# Patient Record
Sex: FEMALE | Race: WHITE | NOT HISPANIC OR LATINO
[De-identification: names, ages, dates, MRNs, and addresses within clinical notes are randomized per-mention and may not be internally consistent; named-entity substitution may affect disease eponyms.]

---

## 2020-03-10 ENCOUNTER — ASOB RESULT (OUTPATIENT)
Age: 40
End: 2020-03-10

## 2020-03-10 ENCOUNTER — APPOINTMENT (OUTPATIENT)
Dept: ANTEPARTUM | Facility: CLINIC | Age: 40
End: 2020-03-10
Payer: COMMERCIAL

## 2020-03-10 PROCEDURE — 76811 OB US DETAILED SNGL FETUS: CPT

## 2020-03-10 PROCEDURE — 76817 TRANSVAGINAL US OBSTETRIC: CPT | Mod: 59

## 2020-03-11 PROBLEM — Z00.00 ENCOUNTER FOR PREVENTIVE HEALTH EXAMINATION: Status: ACTIVE | Noted: 2020-03-11

## 2020-03-18 ENCOUNTER — APPOINTMENT (OUTPATIENT)
Dept: ANTEPARTUM | Facility: CLINIC | Age: 40
End: 2020-03-18
Payer: COMMERCIAL

## 2020-03-18 ENCOUNTER — ASOB RESULT (OUTPATIENT)
Age: 40
End: 2020-03-18

## 2020-03-18 PROCEDURE — 76816 OB US FOLLOW-UP PER FETUS: CPT

## 2020-03-18 PROCEDURE — 76817 TRANSVAGINAL US OBSTETRIC: CPT

## 2020-04-24 ENCOUNTER — RESULT REVIEW (OUTPATIENT)
Age: 40
End: 2020-04-24

## 2020-04-24 ENCOUNTER — OUTPATIENT (OUTPATIENT)
Dept: OUTPATIENT SERVICES | Facility: HOSPITAL | Age: 40
LOS: 1 days | End: 2020-04-24
Payer: COMMERCIAL

## 2020-04-24 ENCOUNTER — APPOINTMENT (OUTPATIENT)
Dept: ULTRASOUND IMAGING | Facility: CLINIC | Age: 40
End: 2020-04-24
Payer: COMMERCIAL

## 2020-04-24 DIAGNOSIS — Z00.8 ENCOUNTER FOR OTHER GENERAL EXAMINATION: ICD-10-CM

## 2020-04-24 PROCEDURE — 93971 EXTREMITY STUDY: CPT

## 2020-04-24 PROCEDURE — 93971 EXTREMITY STUDY: CPT | Mod: 26,LT

## 2020-07-30 ENCOUNTER — TRANSCRIPTION ENCOUNTER (OUTPATIENT)
Age: 40
End: 2020-07-30

## 2020-07-31 ENCOUNTER — INPATIENT (INPATIENT)
Facility: HOSPITAL | Age: 40
LOS: 0 days | Discharge: HOME | End: 2020-08-01
Attending: OBSTETRICS & GYNECOLOGY | Admitting: OBSTETRICS & GYNECOLOGY
Payer: COMMERCIAL

## 2020-07-31 VITALS — WEIGHT: 175.05 LBS | HEIGHT: 65 IN

## 2020-07-31 DIAGNOSIS — Z33.1 PREGNANT STATE, INCIDENTAL: ICD-10-CM

## 2020-07-31 LAB
BASOPHILS # BLD AUTO: 0.03 K/UL — SIGNIFICANT CHANGE UP (ref 0–0.2)
BASOPHILS NFR BLD AUTO: 0.3 % — SIGNIFICANT CHANGE UP (ref 0–2)
BLD GP AB SCN SERPL QL: NEGATIVE — SIGNIFICANT CHANGE UP
EOSINOPHIL # BLD AUTO: 0.14 K/UL — SIGNIFICANT CHANGE UP (ref 0–0.5)
EOSINOPHIL NFR BLD AUTO: 1.5 % — SIGNIFICANT CHANGE UP (ref 0–6)
HCT VFR BLD CALC: 34.9 % — SIGNIFICANT CHANGE UP (ref 34.5–45)
HGB BLD-MCNC: 11.5 G/DL — SIGNIFICANT CHANGE UP (ref 11.5–15.5)
IMM GRANULOCYTES NFR BLD AUTO: 0.4 % — SIGNIFICANT CHANGE UP (ref 0–1.5)
LYMPHOCYTES # BLD AUTO: 1.49 K/UL — SIGNIFICANT CHANGE UP (ref 1–3.3)
LYMPHOCYTES # BLD AUTO: 15.6 % — SIGNIFICANT CHANGE UP (ref 13–44)
MCHC RBC-ENTMCNC: 29.8 PG — SIGNIFICANT CHANGE UP (ref 27–34)
MCHC RBC-ENTMCNC: 33 GM/DL — SIGNIFICANT CHANGE UP (ref 32–36)
MCV RBC AUTO: 90.4 FL — SIGNIFICANT CHANGE UP (ref 80–100)
MONOCYTES # BLD AUTO: 0.77 K/UL — SIGNIFICANT CHANGE UP (ref 0–0.9)
MONOCYTES NFR BLD AUTO: 8.1 % — SIGNIFICANT CHANGE UP (ref 2–14)
NEUTROPHILS # BLD AUTO: 7.09 K/UL — SIGNIFICANT CHANGE UP (ref 1.8–7.4)
NEUTROPHILS NFR BLD AUTO: 74.1 % — SIGNIFICANT CHANGE UP (ref 43–77)
NRBC # BLD: 0 /100 WBCS — SIGNIFICANT CHANGE UP (ref 0–0)
PLATELET # BLD AUTO: 238 K/UL — SIGNIFICANT CHANGE UP (ref 150–400)
RBC # BLD: 3.86 M/UL — SIGNIFICANT CHANGE UP (ref 3.8–5.2)
RBC # FLD: 15.7 % — HIGH (ref 10.3–14.5)
RH IG SCN BLD-IMP: POSITIVE — SIGNIFICANT CHANGE UP
RH IG SCN BLD-IMP: POSITIVE — SIGNIFICANT CHANGE UP
SARS-COV-2 IGG SERPL QL IA: NEGATIVE — SIGNIFICANT CHANGE UP
SARS-COV-2 IGM SERPL IA-ACNC: <0.1 INDEX — SIGNIFICANT CHANGE UP
SARS-COV-2 RNA SPEC QL NAA+PROBE: SIGNIFICANT CHANGE UP
T PALLIDUM AB TITR SER: NEGATIVE — SIGNIFICANT CHANGE UP
WBC # BLD: 9.56 K/UL — SIGNIFICANT CHANGE UP (ref 3.8–10.5)
WBC # FLD AUTO: 9.56 K/UL — SIGNIFICANT CHANGE UP (ref 3.8–10.5)

## 2020-07-31 PROCEDURE — 88307 TISSUE EXAM BY PATHOLOGIST: CPT | Mod: 26

## 2020-07-31 RX ORDER — ASCORBIC ACID 60 MG
500 TABLET,CHEWABLE ORAL DAILY
Refills: 0 | Status: DISCONTINUED | OUTPATIENT
Start: 2020-07-31 | End: 2020-08-01

## 2020-07-31 RX ORDER — LANOLIN
1 OINTMENT (GRAM) TOPICAL EVERY 6 HOURS
Refills: 0 | Status: DISCONTINUED | OUTPATIENT
Start: 2020-07-31 | End: 2020-08-01

## 2020-07-31 RX ORDER — AER TRAVELER 0.5 G/1
1 SOLUTION RECTAL; TOPICAL EVERY 4 HOURS
Refills: 0 | Status: DISCONTINUED | OUTPATIENT
Start: 2020-07-31 | End: 2020-08-01

## 2020-07-31 RX ORDER — MAGNESIUM HYDROXIDE 400 MG/1
30 TABLET, CHEWABLE ORAL
Refills: 0 | Status: DISCONTINUED | OUTPATIENT
Start: 2020-07-31 | End: 2020-08-01

## 2020-07-31 RX ORDER — BENZOCAINE 10 %
1 GEL (GRAM) MUCOUS MEMBRANE EVERY 6 HOURS
Refills: 0 | Status: DISCONTINUED | OUTPATIENT
Start: 2020-07-31 | End: 2020-08-01

## 2020-07-31 RX ORDER — HYDROCORTISONE 1 %
1 OINTMENT (GRAM) TOPICAL EVERY 6 HOURS
Refills: 0 | Status: DISCONTINUED | OUTPATIENT
Start: 2020-07-31 | End: 2020-08-01

## 2020-07-31 RX ORDER — IBUPROFEN 200 MG
600 TABLET ORAL EVERY 6 HOURS
Refills: 0 | Status: DISCONTINUED | OUTPATIENT
Start: 2020-07-31 | End: 2020-08-01

## 2020-07-31 RX ORDER — PRAMOXINE HYDROCHLORIDE 150 MG/15G
1 AEROSOL, FOAM RECTAL EVERY 4 HOURS
Refills: 0 | Status: DISCONTINUED | OUTPATIENT
Start: 2020-07-31 | End: 2020-08-01

## 2020-07-31 RX ORDER — OXYCODONE HYDROCHLORIDE 5 MG/1
5 TABLET ORAL ONCE
Refills: 0 | Status: DISCONTINUED | OUTPATIENT
Start: 2020-07-31 | End: 2020-08-01

## 2020-07-31 RX ORDER — OXYTOCIN 10 UNIT/ML
4 VIAL (ML) INJECTION
Qty: 30 | Refills: 0 | Status: DISCONTINUED | OUTPATIENT
Start: 2020-07-31 | End: 2020-07-31

## 2020-07-31 RX ORDER — DIBUCAINE 1 %
1 OINTMENT (GRAM) RECTAL EVERY 6 HOURS
Refills: 0 | Status: DISCONTINUED | OUTPATIENT
Start: 2020-07-31 | End: 2020-08-01

## 2020-07-31 RX ORDER — SODIUM CHLORIDE 9 MG/ML
1000 INJECTION, SOLUTION INTRAVENOUS
Refills: 0 | Status: DISCONTINUED | OUTPATIENT
Start: 2020-07-31 | End: 2020-07-31

## 2020-07-31 RX ORDER — SODIUM CHLORIDE 9 MG/ML
3 INJECTION INTRAMUSCULAR; INTRAVENOUS; SUBCUTANEOUS EVERY 8 HOURS
Refills: 0 | Status: DISCONTINUED | OUTPATIENT
Start: 2020-07-31 | End: 2020-08-01

## 2020-07-31 RX ORDER — FERROUS SULFATE 325(65) MG
325 TABLET ORAL DAILY
Refills: 0 | Status: DISCONTINUED | OUTPATIENT
Start: 2020-07-31 | End: 2020-08-01

## 2020-07-31 RX ORDER — OXYTOCIN 10 UNIT/ML
333.33 VIAL (ML) INJECTION
Qty: 20 | Refills: 0 | Status: DISCONTINUED | OUTPATIENT
Start: 2020-07-31 | End: 2020-08-01

## 2020-07-31 RX ORDER — CITRIC ACID/SODIUM CITRATE 300-500 MG
15 SOLUTION, ORAL ORAL EVERY 6 HOURS
Refills: 0 | Status: DISCONTINUED | OUTPATIENT
Start: 2020-07-31 | End: 2020-07-31

## 2020-07-31 RX ORDER — OXYCODONE HYDROCHLORIDE 5 MG/1
5 TABLET ORAL
Refills: 0 | Status: DISCONTINUED | OUTPATIENT
Start: 2020-07-31 | End: 2020-08-01

## 2020-07-31 RX ORDER — ACETAMINOPHEN 500 MG
975 TABLET ORAL
Refills: 0 | Status: DISCONTINUED | OUTPATIENT
Start: 2020-07-31 | End: 2020-08-01

## 2020-07-31 RX ORDER — KETOROLAC TROMETHAMINE 30 MG/ML
30 SYRINGE (ML) INJECTION ONCE
Refills: 0 | Status: DISCONTINUED | OUTPATIENT
Start: 2020-07-31 | End: 2020-07-31

## 2020-07-31 RX ORDER — TETANUS TOXOID, REDUCED DIPHTHERIA TOXOID AND ACELLULAR PERTUSSIS VACCINE, ADSORBED 5; 2.5; 8; 8; 2.5 [IU]/.5ML; [IU]/.5ML; UG/.5ML; UG/.5ML; UG/.5ML
0.5 SUSPENSION INTRAMUSCULAR ONCE
Refills: 0 | Status: DISCONTINUED | OUTPATIENT
Start: 2020-07-31 | End: 2020-08-01

## 2020-07-31 RX ORDER — IBUPROFEN 200 MG
600 TABLET ORAL EVERY 6 HOURS
Refills: 0 | Status: COMPLETED | OUTPATIENT
Start: 2020-07-31 | End: 2021-06-29

## 2020-07-31 RX ORDER — DIPHENHYDRAMINE HCL 50 MG
25 CAPSULE ORAL EVERY 6 HOURS
Refills: 0 | Status: DISCONTINUED | OUTPATIENT
Start: 2020-07-31 | End: 2020-08-01

## 2020-07-31 RX ORDER — SIMETHICONE 80 MG/1
80 TABLET, CHEWABLE ORAL EVERY 4 HOURS
Refills: 0 | Status: DISCONTINUED | OUTPATIENT
Start: 2020-07-31 | End: 2020-08-01

## 2020-07-31 RX ADMIN — Medication 30 MILLIGRAM(S): at 17:19

## 2020-07-31 RX ADMIN — Medication 4 MILLIUNIT(S)/MIN: at 06:17

## 2020-07-31 RX ADMIN — Medication 975 MILLIGRAM(S): at 21:55

## 2020-07-31 RX ADMIN — Medication 0.2 MILLIGRAM(S): at 15:14

## 2020-07-31 RX ADMIN — Medication 1000 MILLIUNIT(S)/MIN: at 16:09

## 2020-07-31 RX ADMIN — Medication 975 MILLIGRAM(S): at 21:18

## 2020-07-31 NOTE — CHART NOTE - NSCHARTNOTEFT_GEN_A_CORE
Patient assessed at bedside after nursing reported passage of clot and bleeding with fundal massage.  Upon assessment patient is comfortable and breastfeeding baby.  She denies lightheadedness/dizziness/headache/fever/chills/nausea/vomiting.  Pad has approximately 50cc of blood and clot.    VS  T(C): 37.1 (07-31-20 @ 15:45)  HR: 92 (07-31-20 @ 16:00)  BP: 105/55 (07-31-20 @ 16:00)  RR: 18 (07-31-20 @ 16:00)  SpO2: 95% (07-31-20 @ 16:00)    PE:  Gen - NAD  Abdomen - soft, non tender, fundus firm   - scant blood on fundal massage, no clot in vaginal vault, bilateral swollen labia    A/P: PPD#0 s/p vaginal delivery with  due to partial placental abruption during delivery, hemodynamically stable in the postpartum period  -continue with routine post-op care  -AM CBC  -continue to monitor for bleeding or changes in vital signs    d/w Dr. Tyler Johnson, PGY2 Patient assessed at bedside after nursing reported passage of clot and bleeding with fundal massage.  Upon assessment patient is comfortable and breastfeeding baby.  She denies lightheadedness/dizziness/headache/fever/chills/nausea/vomiting.  Pad has approximately 50cc of blood and clot.    VS  T(C): 37.1 (07-31-20 @ 15:45)  HR: 92 (07-31-20 @ 16:00)  BP: 105/55 (07-31-20 @ 16:00)  RR: 18 (07-31-20 @ 16:00)  SpO2: 95% (07-31-20 @ 16:00)    PE:  Gen - NAD  Abdomen - soft, non tender, fundus firm   - scant blood on fundal massage, no clot in vaginal vault, bilateral swollen labia    A/P: PPD#0 s/p VAVD with  s/p Methergine IM x1 due to partial placental abruption during delivery, hemodynamically stable in the postpartum period  -continue with routine post-op care  -AM CBC  -continue to monitor for bleeding or changes in vital signs    d/w Dr. Tyler Johnson, PGY2

## 2020-08-01 ENCOUNTER — TRANSCRIPTION ENCOUNTER (OUTPATIENT)
Age: 40
End: 2020-08-01

## 2020-08-01 VITALS
TEMPERATURE: 98 F | DIASTOLIC BLOOD PRESSURE: 76 MMHG | RESPIRATION RATE: 18 BRPM | SYSTOLIC BLOOD PRESSURE: 116 MMHG | HEART RATE: 76 BPM

## 2020-08-01 DIAGNOSIS — D50.0 IRON DEFICIENCY ANEMIA SECONDARY TO BLOOD LOSS (CHRONIC): ICD-10-CM

## 2020-08-01 LAB
HCT VFR BLD CALC: 28.7 % — LOW (ref 34.5–45)
HGB BLD-MCNC: 9.3 G/DL — LOW (ref 11.5–15.5)
MCHC RBC-ENTMCNC: 29.6 PG — SIGNIFICANT CHANGE UP (ref 27–34)
MCHC RBC-ENTMCNC: 32.4 GM/DL — SIGNIFICANT CHANGE UP (ref 32–36)
MCV RBC AUTO: 91.4 FL — SIGNIFICANT CHANGE UP (ref 80–100)
NRBC # BLD: 0 /100 WBCS — SIGNIFICANT CHANGE UP (ref 0–0)
PLATELET # BLD AUTO: 187 K/UL — SIGNIFICANT CHANGE UP (ref 150–400)
RBC # BLD: 3.14 M/UL — LOW (ref 3.8–5.2)
RBC # FLD: 15.9 % — HIGH (ref 10.3–14.5)
WBC # BLD: 12.44 K/UL — HIGH (ref 3.8–10.5)
WBC # FLD AUTO: 12.44 K/UL — HIGH (ref 3.8–10.5)

## 2020-08-01 PROCEDURE — 86780 TREPONEMA PALLIDUM: CPT

## 2020-08-01 PROCEDURE — 59050 FETAL MONITOR W/REPORT: CPT

## 2020-08-01 PROCEDURE — 59025 FETAL NON-STRESS TEST: CPT

## 2020-08-01 PROCEDURE — 86900 BLOOD TYPING SEROLOGIC ABO: CPT

## 2020-08-01 PROCEDURE — 86850 RBC ANTIBODY SCREEN: CPT

## 2020-08-01 PROCEDURE — 88307 TISSUE EXAM BY PATHOLOGIST: CPT

## 2020-08-01 PROCEDURE — 87635 SARS-COV-2 COVID-19 AMP PRB: CPT

## 2020-08-01 PROCEDURE — 85027 COMPLETE CBC AUTOMATED: CPT

## 2020-08-01 PROCEDURE — 86769 SARS-COV-2 COVID-19 ANTIBODY: CPT

## 2020-08-01 PROCEDURE — 86901 BLOOD TYPING SEROLOGIC RH(D): CPT

## 2020-08-01 RX ADMIN — Medication 600 MILLIGRAM(S): at 13:30

## 2020-08-01 RX ADMIN — Medication 600 MILLIGRAM(S): at 06:55

## 2020-08-01 RX ADMIN — Medication 975 MILLIGRAM(S): at 03:02

## 2020-08-01 RX ADMIN — Medication 975 MILLIGRAM(S): at 03:35

## 2020-08-01 RX ADMIN — Medication 975 MILLIGRAM(S): at 10:00

## 2020-08-01 RX ADMIN — Medication 500 MILLIGRAM(S): at 12:44

## 2020-08-01 RX ADMIN — Medication 600 MILLIGRAM(S): at 00:40

## 2020-08-01 RX ADMIN — Medication 600 MILLIGRAM(S): at 12:43

## 2020-08-01 RX ADMIN — Medication 1 TABLET(S): at 12:43

## 2020-08-01 RX ADMIN — Medication 600 MILLIGRAM(S): at 00:07

## 2020-08-01 RX ADMIN — Medication 600 MILLIGRAM(S): at 06:20

## 2020-08-01 RX ADMIN — Medication 325 MILLIGRAM(S): at 12:44

## 2020-08-01 RX ADMIN — Medication 975 MILLIGRAM(S): at 09:15

## 2020-08-01 NOTE — PROGRESS NOTE ADULT - ASSESSMENT
A/P: 39y  PPD # 1   S/P VAVD due to placental abruption and cat II tracing. EBL 700cc, now w/ anemia due to acute blood loss during delivery.

## 2020-08-01 NOTE — DISCHARGE NOTE OB - PATIENT PORTAL LINK FT
You can access the FollowMyHealth Patient Portal offered by [facility] by registering at the following website: [email or web address]. By joining [employer]’s FollowMyHealth portal, you will also be able to view your health information using other applications (apps) compatible with our system.

## 2020-08-01 NOTE — DISCHARGE NOTE OB - CARE PROVIDER_API CALL
Joshua Matthews  OBSTETRICS AND GYNECOLOGY  [address] [place] [address] [address]  [place] [address]  Phone: [phone]  Fax: [phone]  Follow Up Time:

## 2020-08-01 NOTE — PROGRESS NOTE ADULT - SUBJECTIVE AND OBJECTIVE BOX
Postpartum Note- PPD#1    Prenatal Labs  Blood type: O Positive  Rubella IgG:   RPR: Negative      S:Patient w/o complaints, pain is controlled.    Pt is OOB, tolerating PO, voiding. Vaginal bleeding moderate to heavy but no passage of clots. She is changing a saturated pad every 3-4 hours. Patient is breastfeeding. Denies dizziness, CP, SOB, n/v, abdominal pain or calf pain.    pmhx/surg: h/o     O:  Vital Signs Last 24 Hrs  T(C): 36.7 (01 Aug 2020 05:04), Max: 37.5 (2020 17:45)  T(F): 98 (01 Aug 2020 05:04), Max: 99.5 (2020 17:45)  HR: 68 (01 Aug 2020 05:04) (68 - 94)  BP: 102/54 (01 Aug 2020 05:04) (97/54 - 118/65)  BP(mean): 86 (2020 17:45) (71 - 86)  RR: 18 (01 Aug 2020 05:04) (18 - 18)  SpO2: 98% (01 Aug 2020 05:04) (95% - 99%)     Gen: NAD  Abdomen: Soft, nontender, non-distended, fundus firm.  Vaginal: Lochia WNL. Pad mostly clean (placed 2 hours ago)  Ext: Neg edema, Neg calf tenderness    LABS:    Hemoglobin: 9.3 g/dL ( @ 05:11)  Hemoglobin: 11.5 g/dL ( @ 05:57)      Hematocrit: 28.7 % ( @ 05:11)  Hematocrit: 34.9 % ( @ 05:57) Postpartum Note- PPD#1    Prenatal Labs  Blood type: O Positive  Rubella IgG: immune  RPR: Negative      S:Patient w/o complaints, pain is controlled.    Pt is OOB, tolerating PO, voiding. Vaginal bleeding moderate to heavy but no passage of clots. She is changing a saturated pad every 3-4 hours. Patient is breastfeeding. Denies dizziness, CP, SOB, n/v, abdominal pain or calf pain.    pmhx/surg: h/o     O:  Vital Signs Last 24 Hrs  T(C): 36.7 (01 Aug 2020 05:04), Max: 37.5 (2020 17:45)  T(F): 98 (01 Aug 2020 05:04), Max: 99.5 (2020 17:45)  HR: 68 (01 Aug 2020 05:04) (68 - 94)  BP: 102/54 (01 Aug 2020 05:04) (97/54 - 118/65)  BP(mean): 86 (2020 17:45) (71 - 86)  RR: 18 (01 Aug 2020 05:04) (18 - 18)  SpO2: 98% (01 Aug 2020 05:04) (95% - 99%)     Gen: NAD  Abdomen: Soft, nontender, non-distended, fundus firm.  Vaginal: Lochia WNL. Pad mostly clean (placed 2 hours ago)  Ext: Neg edema, Neg calf tenderness    LABS:    Hemoglobin: 9.3 g/dL ( @ 05:11)  Hemoglobin: 11.5 g/dL ( @ 05:57)      Hematocrit: 28.7 % ( @ 05:11)  Hematocrit: 34.9 % ( @ 05:57)

## 2021-03-10 ENCOUNTER — TRANSCRIPTION ENCOUNTER (OUTPATIENT)
Age: 41
End: 2021-03-10

## 2022-09-19 ENCOUNTER — APPOINTMENT (OUTPATIENT)
Dept: RADIOLOGY | Facility: HOSPITAL | Age: 42
End: 2022-09-19

## 2022-09-19 ENCOUNTER — OUTPATIENT (OUTPATIENT)
Dept: OUTPATIENT SERVICES | Facility: HOSPITAL | Age: 42
LOS: 1 days | End: 2022-09-19
Payer: COMMERCIAL

## 2022-09-19 DIAGNOSIS — Z00.8 ENCOUNTER FOR OTHER GENERAL EXAMINATION: ICD-10-CM

## 2022-09-19 PROCEDURE — 72070 X-RAY EXAM THORAC SPINE 2VWS: CPT

## 2022-09-19 PROCEDURE — 72100 X-RAY EXAM L-S SPINE 2/3 VWS: CPT

## 2022-09-19 PROCEDURE — 72040 X-RAY EXAM NECK SPINE 2-3 VW: CPT | Mod: 26

## 2022-09-19 PROCEDURE — 72070 X-RAY EXAM THORAC SPINE 2VWS: CPT | Mod: 26

## 2022-09-19 PROCEDURE — 72100 X-RAY EXAM L-S SPINE 2/3 VWS: CPT | Mod: 26

## 2022-09-19 PROCEDURE — 72040 X-RAY EXAM NECK SPINE 2-3 VW: CPT

## 2022-09-21 ENCOUNTER — APPOINTMENT (OUTPATIENT)
Dept: ORTHOPEDIC SURGERY | Facility: CLINIC | Age: 42
End: 2022-09-21

## 2022-09-21 ENCOUNTER — NON-APPOINTMENT (OUTPATIENT)
Age: 42
End: 2022-09-21

## 2022-09-21 VITALS
HEIGHT: 65 IN | DIASTOLIC BLOOD PRESSURE: 86 MMHG | SYSTOLIC BLOOD PRESSURE: 135 MMHG | WEIGHT: 116 LBS | BODY MASS INDEX: 19.33 KG/M2 | HEART RATE: 109 BPM

## 2022-09-21 DIAGNOSIS — M54.6 PAIN IN THORACIC SPINE: ICD-10-CM

## 2022-09-21 PROCEDURE — 99204 OFFICE O/P NEW MOD 45 MIN: CPT | Mod: 25

## 2022-09-21 PROCEDURE — 96372 THER/PROPH/DIAG INJ SC/IM: CPT

## 2022-09-21 RX ADMIN — KETOROLAC TROMETHAMINE 0 MG/ML: 30 INJECTION, SOLUTION INTRAMUSCULAR; INTRAVENOUS at 00:00

## 2022-09-21 NOTE — PHYSICAL EXAM
[Stooped] : stooped [LE] : Sensory: Intact in bilateral lower extremities [Knee] : patellar 2+ and symmetric bilaterally [Ankle] : ankle 2+ and symmetric bilaterally [DP] : dorsalis pedis 2+ and symmetric bilaterally [SLR] : negative straight leg raise [de-identified] : The pt is awake, alert and oriented to self, place and time, is comfortable and in no acute distress. Inspection of neck, back and lower extremities bilaterally reveals no rashes or ecchymotic lesions.  There is no obvious abnormal spinal curvature in the sagittal and coronal planes. There is no tenderness over the cervical, thoracic spine, or the paraspinal or upper and lower extremities musculature.  Midline lumbar tenderness.  There is no sacroiliac tenderness. No greater trochanteric tenderness bilaterally. No atrophy or abnormal movements noted in the upper or lower extremities. There is no swelling noted in the upper or lower extremities bilaterally. No cervical lymphadenopathy noted anteriorly. No joint laxity noted in the upper and lower extremity joints bilaterally.\par Hip range of motion is degrees internal rotation 30° external rotation without pain. Full range of motion of the shoulders bilaterally with no significant pain\par There is no groin pain with hip internal rotation and a negative LARS test bilaterally.  [de-identified] : flex to mid tibia with reversal of spinal rhythm, 20 degrees extension with back pain [de-identified] : ACC: [id number] EXAM: XR T SPINE 2 VIEWS\par ACC: [id number] EXAM: XR LS SPINE AP LAT 2-3 VIEWS\par \par PROCEDURE DATE: 09/19/2022\par \par INTERPRETATION: CLINICAL INDICATION: back pain\par \par EXAM:\par AP lateral thoracic spine and AP lateral coned-down lateral lumbosacral spine from 9/19/2022 at 1801. No similar prior studies available for comparison.\par \par IMPRESSION:\par No compression fractures, spondylolistheses, or spondylolysis defects.\par \par Disk space heights preserved and facet alignment maintained.\par \par Unremarkable SI joints and pubic symphysis. Preserved bilateral hip joint spaces and no gross radiographic evidence for AVN.\par \par Small cystic lucency with thin sclerotic margination in lateral right femoral head neck junction region compatible with subcortical cyst/synovial herniation pit. No additional focal osseous lesions.\par \par --- End of Report ---\par \par \par DEWAYNE CHRISTENSEN MD; Attending Radiologist\par This document has been electronically signed. Sep 20 2022 10:33AM

## 2022-09-21 NOTE — DISCUSSION/SUMMARY
[Medication Risks Reviewed] : Medication risks reviewed [de-identified] : X-rays of the thoracic and lumbar spine performed previously were independently reviewed by me and findings discussed with the patient.  She has reported significant low back pain after trying to lift her child from a car seat 2 weeks ago.  X-rays do not show any obvious significant pathology in the lumbar spine and I suspect lumbar sprain.  Prescribed her diclofenac and methocarbamol as well as physical therapy.  For pain today offered her an injection of Toradol and she wanted proceed.  Under sterile conditions 30 mg of Toradol was administered intermuscularly by the LPN without incident.\par \par Recommended follow-up in 4 to 6 weeks on as-needed basis.  If her symptoms persist or worsen MRI lumbar spine and/or thoracic spine may be considered.\par \par We discussed the subcortical cyst synovial herniation pit noted on her right femoral head neck junction.  For now there is no clinical significance on exam.  However this can be evaluated by a hip surgeon in the future if she would like to proceed with it.
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
## 2022-09-22 ENCOUNTER — NON-APPOINTMENT (OUTPATIENT)
Age: 42
End: 2022-09-22

## 2022-09-23 ENCOUNTER — EMERGENCY (EMERGENCY)
Facility: HOSPITAL | Age: 42
LOS: 1 days | Discharge: HOME | End: 2022-09-23
Attending: EMERGENCY MEDICINE | Admitting: EMERGENCY MEDICINE
Payer: COMMERCIAL

## 2022-09-23 VITALS
HEIGHT: 65 IN | SYSTOLIC BLOOD PRESSURE: 141 MMHG | DIASTOLIC BLOOD PRESSURE: 83 MMHG | RESPIRATION RATE: 18 BRPM | HEART RATE: 112 BPM | TEMPERATURE: 99 F | OXYGEN SATURATION: 97 %

## 2022-09-23 LAB
ALBUMIN SERPL ELPH-MCNC: 3.7 G/DL — SIGNIFICANT CHANGE UP (ref 3.3–5)
ALP SERPL-CCNC: 63 U/L — SIGNIFICANT CHANGE UP (ref 40–120)
ALT FLD-CCNC: 19 U/L — SIGNIFICANT CHANGE UP (ref 10–45)
ANION GAP SERPL CALC-SCNC: 7 MMOL/L — SIGNIFICANT CHANGE UP (ref 5–17)
APPEARANCE UR: CLEAR — SIGNIFICANT CHANGE UP
AST SERPL-CCNC: 14 U/L — SIGNIFICANT CHANGE UP (ref 10–40)
BACTERIA # UR AUTO: ABNORMAL /HPF
BASOPHILS # BLD AUTO: 0.02 K/UL — SIGNIFICANT CHANGE UP (ref 0–0.2)
BASOPHILS NFR BLD AUTO: 0.3 % — SIGNIFICANT CHANGE UP (ref 0–2)
BILIRUB SERPL-MCNC: 0.6 MG/DL — SIGNIFICANT CHANGE UP (ref 0.2–1.2)
BILIRUB UR-MCNC: NEGATIVE — SIGNIFICANT CHANGE UP
BUN SERPL-MCNC: 6 MG/DL — LOW (ref 7–23)
CALCIUM SERPL-MCNC: 9.1 MG/DL — SIGNIFICANT CHANGE UP (ref 8.4–10.5)
CHLORIDE SERPL-SCNC: 100 MMOL/L — SIGNIFICANT CHANGE UP (ref 96–108)
CO2 SERPL-SCNC: 27 MMOL/L — SIGNIFICANT CHANGE UP (ref 22–31)
COLOR SPEC: YELLOW — SIGNIFICANT CHANGE UP
CREAT SERPL-MCNC: 0.71 MG/DL — SIGNIFICANT CHANGE UP (ref 0.5–1.3)
DIFF PNL FLD: ABNORMAL
EGFR: 109 ML/MIN/1.73M2 — SIGNIFICANT CHANGE UP
EOSINOPHIL # BLD AUTO: 0.01 K/UL — SIGNIFICANT CHANGE UP (ref 0–0.5)
EOSINOPHIL NFR BLD AUTO: 0.1 % — SIGNIFICANT CHANGE UP (ref 0–6)
EPI CELLS # UR: ABNORMAL
GLUCOSE SERPL-MCNC: 106 MG/DL — HIGH (ref 70–99)
GLUCOSE UR QL: NEGATIVE — SIGNIFICANT CHANGE UP
HCT VFR BLD CALC: 33.4 % — LOW (ref 34.5–45)
HGB BLD-MCNC: 11 G/DL — LOW (ref 11.5–15.5)
IMM GRANULOCYTES NFR BLD AUTO: 0.9 % — SIGNIFICANT CHANGE UP (ref 0–0.9)
KETONES UR-MCNC: ABNORMAL
LEUKOCYTE ESTERASE UR-ACNC: ABNORMAL
LYMPHOCYTES # BLD AUTO: 0.97 K/UL — LOW (ref 1–3.3)
LYMPHOCYTES # BLD AUTO: 14.3 % — SIGNIFICANT CHANGE UP (ref 13–44)
MCHC RBC-ENTMCNC: 28.9 PG — SIGNIFICANT CHANGE UP (ref 27–34)
MCHC RBC-ENTMCNC: 32.9 GM/DL — SIGNIFICANT CHANGE UP (ref 32–36)
MCV RBC AUTO: 87.9 FL — SIGNIFICANT CHANGE UP (ref 80–100)
MONOCYTES # BLD AUTO: 0.63 K/UL — SIGNIFICANT CHANGE UP (ref 0–0.9)
MONOCYTES NFR BLD AUTO: 9.3 % — SIGNIFICANT CHANGE UP (ref 2–14)
NEUTROPHILS # BLD AUTO: 5.09 K/UL — SIGNIFICANT CHANGE UP (ref 1.8–7.4)
NEUTROPHILS NFR BLD AUTO: 75.1 % — SIGNIFICANT CHANGE UP (ref 43–77)
NITRITE UR-MCNC: NEGATIVE — SIGNIFICANT CHANGE UP
NRBC # BLD: 0 /100 WBCS — SIGNIFICANT CHANGE UP (ref 0–0)
PH UR: 6.5 — SIGNIFICANT CHANGE UP (ref 5–8)
PLATELET # BLD AUTO: 283 K/UL — SIGNIFICANT CHANGE UP (ref 150–400)
POTASSIUM SERPL-MCNC: 3.5 MMOL/L — SIGNIFICANT CHANGE UP (ref 3.5–5.3)
POTASSIUM SERPL-SCNC: 3.5 MMOL/L — SIGNIFICANT CHANGE UP (ref 3.5–5.3)
PROT SERPL-MCNC: 7.9 G/DL — SIGNIFICANT CHANGE UP (ref 6–8.3)
PROT UR-MCNC: NEGATIVE — SIGNIFICANT CHANGE UP
RAPID RVP RESULT: SIGNIFICANT CHANGE UP
RBC # BLD: 3.8 M/UL — SIGNIFICANT CHANGE UP (ref 3.8–5.2)
RBC # FLD: 13.7 % — SIGNIFICANT CHANGE UP (ref 10.3–14.5)
RBC CASTS # UR COMP ASSIST: SIGNIFICANT CHANGE UP /HPF (ref 0–4)
SARS-COV-2 RNA SPEC QL NAA+PROBE: SIGNIFICANT CHANGE UP
SODIUM SERPL-SCNC: 134 MMOL/L — LOW (ref 135–145)
SP GR SPEC: 1.01 — SIGNIFICANT CHANGE UP (ref 1.01–1.02)
UROBILINOGEN FLD QL: NEGATIVE — SIGNIFICANT CHANGE UP
WBC # BLD: 6.78 K/UL — SIGNIFICANT CHANGE UP (ref 3.8–10.5)
WBC # FLD AUTO: 6.78 K/UL — SIGNIFICANT CHANGE UP (ref 3.8–10.5)
WBC UR QL: ABNORMAL /HPF (ref 0–5)

## 2022-09-23 PROCEDURE — 99284 EMERGENCY DEPT VISIT MOD MDM: CPT | Mod: 25

## 2022-09-23 PROCEDURE — 81001 URINALYSIS AUTO W/SCOPE: CPT

## 2022-09-23 PROCEDURE — 0225U NFCT DS DNA&RNA 21 SARSCOV2: CPT

## 2022-09-23 PROCEDURE — 74177 CT ABD & PELVIS W/CONTRAST: CPT | Mod: MA

## 2022-09-23 PROCEDURE — 80053 COMPREHEN METABOLIC PANEL: CPT

## 2022-09-23 PROCEDURE — 85025 COMPLETE CBC W/AUTO DIFF WBC: CPT

## 2022-09-23 PROCEDURE — 96361 HYDRATE IV INFUSION ADD-ON: CPT

## 2022-09-23 PROCEDURE — 96365 THER/PROPH/DIAG IV INF INIT: CPT | Mod: XU

## 2022-09-23 PROCEDURE — 87086 URINE CULTURE/COLONY COUNT: CPT

## 2022-09-23 PROCEDURE — 74177 CT ABD & PELVIS W/CONTRAST: CPT | Mod: 26,MA

## 2022-09-23 PROCEDURE — 96375 TX/PRO/DX INJ NEW DRUG ADDON: CPT

## 2022-09-23 PROCEDURE — 96368 THER/DIAG CONCURRENT INF: CPT

## 2022-09-23 PROCEDURE — 99285 EMERGENCY DEPT VISIT HI MDM: CPT

## 2022-09-23 PROCEDURE — 36415 COLL VENOUS BLD VENIPUNCTURE: CPT

## 2022-09-23 PROCEDURE — 87591 N.GONORRHOEAE DNA AMP PROB: CPT

## 2022-09-23 PROCEDURE — 87491 CHLMYD TRACH DNA AMP PROBE: CPT

## 2022-09-23 RX ORDER — METHOCARBAMOL 500 MG/1
1 TABLET, FILM COATED ORAL
Qty: 15 | Refills: 0
Start: 2022-09-23 | End: 2022-09-27

## 2022-09-23 RX ORDER — IBUPROFEN 200 MG
1 TABLET ORAL
Qty: 20 | Refills: 0
Start: 2022-09-23 | End: 2022-09-27

## 2022-09-23 RX ORDER — ACETAMINOPHEN 500 MG
2 TABLET ORAL
Qty: 40 | Refills: 0
Start: 2022-09-23 | End: 2022-09-27

## 2022-09-23 RX ORDER — FAMOTIDINE 10 MG/ML
20 INJECTION INTRAVENOUS ONCE
Refills: 0 | Status: DISCONTINUED | OUTPATIENT
Start: 2022-09-23 | End: 2022-09-23

## 2022-09-23 RX ORDER — FAMOTIDINE 10 MG/ML
20 INJECTION INTRAVENOUS ONCE
Refills: 0 | Status: COMPLETED | OUTPATIENT
Start: 2022-09-23 | End: 2022-09-23

## 2022-09-23 RX ORDER — SODIUM CHLORIDE 9 MG/ML
1000 INJECTION INTRAMUSCULAR; INTRAVENOUS; SUBCUTANEOUS ONCE
Refills: 0 | Status: COMPLETED | OUTPATIENT
Start: 2022-09-23 | End: 2022-09-23

## 2022-09-23 RX ORDER — ACETAMINOPHEN 500 MG
650 TABLET ORAL ONCE
Refills: 0 | Status: COMPLETED | OUTPATIENT
Start: 2022-09-23 | End: 2022-09-23

## 2022-09-23 RX ORDER — DEXTROAMPHETAMINE SACCHARATE, AMPHETAMINE ASPARTATE, DEXTROAMPHETAMINE SULFATE AND AMPHETAMINE SULFATE 1.875; 1.875; 1.875; 1.875 MG/1; MG/1; MG/1; MG/1
1 TABLET ORAL
Qty: 0 | Refills: 0 | DISCHARGE

## 2022-09-23 RX ORDER — DIPHENHYDRAMINE HCL 50 MG
25 CAPSULE ORAL ONCE
Refills: 0 | Status: COMPLETED | OUTPATIENT
Start: 2022-09-23 | End: 2022-09-23

## 2022-09-23 RX ADMIN — SODIUM CHLORIDE 1000 MILLILITER(S): 9 INJECTION INTRAMUSCULAR; INTRAVENOUS; SUBCUTANEOUS at 14:10

## 2022-09-23 RX ADMIN — SODIUM CHLORIDE 1000 MILLILITER(S): 9 INJECTION INTRAMUSCULAR; INTRAVENOUS; SUBCUTANEOUS at 12:02

## 2022-09-23 RX ADMIN — Medication 650 MILLIGRAM(S): at 13:15

## 2022-09-23 RX ADMIN — FAMOTIDINE 20 MILLIGRAM(S): 10 INJECTION INTRAVENOUS at 14:15

## 2022-09-23 RX ADMIN — Medication 25 MILLIGRAM(S): at 12:54

## 2022-09-23 RX ADMIN — Medication 650 MILLIGRAM(S): at 12:03

## 2022-09-23 RX ADMIN — Medication 125 MILLIGRAM(S): at 12:55

## 2022-09-23 RX ADMIN — FAMOTIDINE 100 MILLIGRAM(S): 10 INJECTION INTRAVENOUS at 13:33

## 2022-09-23 NOTE — ED ADULT NURSE NOTE - CAS ELECT INFOMATION PROVIDED
DC instructions S Plasty Text: Given the location and shape of the defect, and the orientation of relaxed skin tension lines, an S-plasty was deemed most appropriate for repair.  Using a sterile surgical marker, the appropriate outline of the S-plasty was drawn, incorporating the defect and placing the expected incisions within the relaxed skin tension lines where possible.  The area thus outlined was incised deep to adipose tissue with a #15 scalpel blade.  The skin margins were undermined to an appropriate distance in all directions utilizing iris scissors. The skin flaps were advanced over the defect.  The opposing margins were then approximated with interrupted buried subcutaneous sutures.

## 2022-09-23 NOTE — ED PROVIDER NOTE - CLINICAL SUMMARY MEDICAL DECISION MAKING FREE TEXT BOX
43 y/o female with pmh of ADHD presenting with lower back pain and pelvic pain. Lower back pain began 2 weeks ago when she was pulling her child out of a car seat, at this time she felt "stuck in place," at this time she was seen by a chiropractor that came to her home who told her she was "very inflamed," pain somewhat relieved. Was then seen by Dr. Monroe on 9/19, where she had "xrays which were negative" and given ketorolac 30mg IM with good pain relief. Now presenting with pelvic pain, unable to stand up straight, took temperature at home which was 101.3 +chills. +sick contacts, states children have cold like symptoms. LMP 2weeks ago. No known STDs.    Plan: 1L NS, acetaminophen 650mg PO, CBC, CMP, UA, UC, RVP, chlamydia/GC, UCG 41 y/o female with pmh of ADHD presenting with lower back pain and pelvic pain. Lower back pain began 2 weeks ago when she was pulling her child out of a car seat, at this time she felt "stuck in place," at this time she was seen by a chiropractor that came to her home who told her she was "very inflamed," pain somewhat relieved. Was then seen by Dr. Monroe on 9/19, where she had "xrays which were negative" and given ketorolac 30mg IM with good pain relief. Now presenting with pelvic pain, unable to stand up straight, took temperature at home which was 101.3 +chills. +sick contacts, states children have cold like symptoms. LMP 2weeks ago. No known STDs.    Plan: 1L NS, acetaminophen 650mg PO, CBC, CMP, UA, UC, RVP, chlamydia/GC, UCG    CT with + pyelo. Discussed with patient ; advise follow up with primary doctor and Levaquin. First dose given in the ED via IV. Advise rest, hydration. Worsening, continued or ANY new concerning symptoms return to the emergency department.

## 2022-09-23 NOTE — ED ADULT NURSE NOTE - OBJECTIVE STATEMENT
low back pain for 2 weeks, seen at [facility] given shot and now also has lower abdominal/pelvic pain with clear liquid vaginal discharge in addituion to catching a cold from her kids. Moist cough, skin warm dry color pink.

## 2022-09-23 NOTE — ED PROVIDER NOTE - PATIENT PORTAL LINK FT
You can access the FollowMyHealth Patient Portal offered by [facility] by registering at the following website: [email or web address]. By joining [employer]’s FollowMyHealth portal, you will also be able to view your health information using other applications (apps) compatible with our system.

## 2022-09-23 NOTE — ED PROVIDER NOTE - ATTENDING CONTRIBUTION TO CARE
Gilles with NP Student Laura. 41 y/o female with pmh of ADHD presenting with lower back pain and pelvic pain. Lower back pain began 2 weeks ago when she was pulling her child out of a car seat, at this time she felt "stuck in place," at this time she was seen by a chiropractor that came to her home who told her she was "very inflamed," pain somewhat relieved. Was then seen by Dr. Monroe on 9/19, where she had "xrays which were negative" and given ketorolac 30mg IM with good pain relief. Now presenting with pelvic pain, unable to stand up straight, took temperature at home which was 101.3 +chills. +sick contacts, states children have cold like symptoms. LMP 2weeks ago. No known STDs.    Plan: 1L NS, acetaminophen 650mg PO, CBC, CMP, UA, UC, RVP, chlamydia/GC, UCG    CT with + pyelo. Discussed with patient ; advise follow up with primary doctor and Levaquin. First dose given in the ED via IV. Advise rest, hydration. Worsening, continued or ANY new concerning symptoms return to the emergency department.    I performed a face to face bedside interview with patient regarding history of present illness, review of symptoms and past medical history. I completed an independent physical exam.  I have discussed the patient's plan of care with Physician Assistant (PA). I agree with note as stated above, having amended the EMR as needed to reflect my findings.   This includes History of Present Illness, HIV, Past Medical/Surgical/Family/Social History, Allergies and Home Medications, Review of Systems, Physical Exam, and any Progress Notes during the time I functioned as the attending physician for this patient.

## 2022-09-23 NOTE — ED ADULT TRIAGE NOTE - CHIEF COMPLAINT QUOTE
Patient presents to ED from home complaining of lower back pain, lower abdominal/pelvic pain, fever. Patient states she injured her back 2 weeks ago, and also caught a cold from her kids 1 week ago. Patient was seen by spine doctor 2 days ago and given injection, since then has had severe abdominal/ pelvic pain and clear vaginal discharge and noticed fevers starting this morning. Denies urinary symptoms.

## 2022-09-23 NOTE — ED PROVIDER NOTE - NS ED ATTENDING STATEMENT MOD
I have seen and examined this patient and fully participated in the care of this patient as the teaching attending.  The service was shared with the LORENZO.  I reviewed and verified the documentation and independently performed the documented:

## 2022-09-23 NOTE — ED PROVIDER NOTE - OBJECTIVE STATEMENT
41 y/o female with pmh of ADHD presenting with lower back pain and pelvic pain. Lower back pain began 2 weeks ago when she was pulling her child out of a car seat, at this time she felt "stuck in place," at this time she was seen by a chiropractor that came to her home who told her she was "very inflamed," pain somewhat relieved. 43 y/o female with pmh of ADHD presenting with lower back pain and pelvic pain. Lower back pain began 2 weeks ago when she was pulling her child out of a car seat, at this time she felt "stuck in place," at this time she was seen by a chiropractor that came to her home who told her she was "very inflamed," pain somewhat relieved. Was then seen by Dr. Monroe on 9/19, where she had "xrays which were negative" and given ketorolac 30mg IM with good pain relief. Now presenting with pelvic pain, unable to stand up straight, took temperature at home which was 101.3 +chills. +sick contacts, states children have cold like symptoms. LMP 2weeks ago. No known STDs.

## 2022-09-23 NOTE — ED PROVIDER NOTE - CROS ED RESP ALL NEG
Spoke with pt about scheduling a f/u appt with pt after H.Pylori treatment.  Pt states she would like to hold off on scheduling appt and would like our office to call her back in ~ 2 weeks to check in.       negative...

## 2022-09-23 NOTE — ED PROVIDER NOTE - CHPI ED SYMPTOMS POS
[facility]  ED Arrival Note    Arrives through triage. ABC's intact. A &O X4. . Pt arrives with c/o headache and neck pain that radiates to her back. Headache is described as head pressure. Endorses dizziness as well. Patient takes Estrogen and progesterone at home as is concerned about having a stroke. No neurologic deficits noted in triage per [facility].       Visitors during triage: Spouse      Triage Interventions: N/A    Ambulatory: Yes    Meets Stroke Criteria?: No    Meets Trauma Criteria?: No    Directed to: Main ED    Pronouns: she/her    
+pelvic pain/CHILLS/FEVER/PAIN

## 2022-09-24 LAB
C TRACH RRNA SPEC QL NAA+PROBE: SIGNIFICANT CHANGE UP
N GONORRHOEA RRNA SPEC QL NAA+PROBE: SIGNIFICANT CHANGE UP
SPECIMEN SOURCE: SIGNIFICANT CHANGE UP

## 2022-09-25 LAB
CULTURE RESULTS: SIGNIFICANT CHANGE UP
SPECIMEN SOURCE: SIGNIFICANT CHANGE UP

## 2022-09-25 NOTE — ED POST DISCHARGE NOTE - DETAILS
Spoke with pt, pt is afebrile and back pain is improved. Advised pt to f/u with PMD. Pt doesn't have one. Referral coordinator to make appt for patient. Spoke with pt, pt is afebrile and back pain is improved. Pt develops anaphylaxis to Levaquin. Advised pt to f/u with PMD. Pt doesn't have one. Referral coordinator to make appt for patient.

## 2022-09-26 PROBLEM — F90.9 ATTENTION-DEFICIT HYPERACTIVITY DISORDER, UNSPECIFIED TYPE: Chronic | Status: ACTIVE | Noted: 2022-09-23

## 2022-10-20 ENCOUNTER — APPOINTMENT (OUTPATIENT)
Dept: FAMILY MEDICINE | Facility: CLINIC | Age: 42
End: 2022-10-20

## 2022-10-20 ENCOUNTER — NON-APPOINTMENT (OUTPATIENT)
Age: 42
End: 2022-10-20

## 2022-10-20 VITALS
OXYGEN SATURATION: 98 % | DIASTOLIC BLOOD PRESSURE: 77 MMHG | HEART RATE: 71 BPM | SYSTOLIC BLOOD PRESSURE: 115 MMHG | HEIGHT: 63.4 IN | WEIGHT: 124 LBS | BODY MASS INDEX: 21.7 KG/M2 | TEMPERATURE: 97.6 F | RESPIRATION RATE: 14 BRPM

## 2022-10-20 DIAGNOSIS — M54.9 DORSALGIA, UNSPECIFIED: ICD-10-CM

## 2022-10-20 DIAGNOSIS — I83.813 VARICOSE VEINS OF BILATERAL LOWER EXTREMITIES WITH PAIN: ICD-10-CM

## 2022-10-20 DIAGNOSIS — Z12.39 ENCOUNTER FOR OTHER SCREENING FOR MALIGNANT NEOPLASM OF BREAST: ICD-10-CM

## 2022-10-20 DIAGNOSIS — Z81.8 FAMILY HISTORY OF OTHER MENTAL AND BEHAVIORAL DISORDERS: ICD-10-CM

## 2022-10-20 DIAGNOSIS — K59.00 CONSTIPATION, UNSPECIFIED: ICD-10-CM

## 2022-10-20 DIAGNOSIS — Z87.448 PERSONAL HISTORY OF OTHER DISEASES OF URINARY SYSTEM: ICD-10-CM

## 2022-10-20 DIAGNOSIS — F90.9 ATTENTION-DEFICIT HYPERACTIVITY DISORDER, UNSPECIFIED TYPE: ICD-10-CM

## 2022-10-20 PROCEDURE — 99205 OFFICE O/P NEW HI 60 MIN: CPT | Mod: 25

## 2022-10-20 PROCEDURE — 99386 PREV VISIT NEW AGE 40-64: CPT

## 2022-10-21 PROBLEM — Z81.8 FAMILY HISTORY OF DEPRESSION: Status: ACTIVE | Noted: 2022-10-21

## 2022-10-21 PROBLEM — I83.813 VARICOSE VEINS OF BOTH LOWER EXTREMITIES WITH PAIN: Status: ACTIVE | Noted: 2022-10-21

## 2022-10-21 PROBLEM — F90.9 ATTENTION DEFICIT HYPERACTIVITY DISORDER (ADHD), UNSPECIFIED ADHD TYPE: Status: ACTIVE | Noted: 2022-10-21

## 2022-10-21 PROBLEM — M54.9 ACUTE BACK PAIN LESS THAN 4 WEEKS DURATION: Status: ACTIVE | Noted: 2022-09-21

## 2022-10-21 PROBLEM — Z12.39 ENCOUNTER FOR SCREENING FOR MALIGNANT NEOPLASM OF BREAST, UNSPECIFIED SCREENING MODALITY: Status: RESOLVED | Noted: 2022-10-20 | Resolved: 2022-11-19

## 2022-10-21 PROBLEM — K59.00 CONSTIPATION, UNSPECIFIED CONSTIPATION TYPE: Status: RESOLVED | Noted: 2022-10-21 | Resolved: 2022-11-20

## 2022-10-21 RX ORDER — DICLOFENAC SODIUM 50 MG/1
50 TABLET, DELAYED RELEASE ORAL
Qty: 30 | Refills: 0 | Status: DISCONTINUED | COMMUNITY
Start: 2022-09-21 | End: 2022-10-21

## 2022-10-21 RX ORDER — METHOCARBAMOL 500 MG/1
500 TABLET, FILM COATED ORAL 3 TIMES DAILY
Qty: 30 | Refills: 0 | Status: DISCONTINUED | COMMUNITY
Start: 2022-09-21 | End: 2022-10-21
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
## 2022-10-24 ENCOUNTER — APPOINTMENT (OUTPATIENT)
Dept: OBGYN | Facility: CLINIC | Age: 42
End: 2022-10-24

## 2022-10-24 VITALS
SYSTOLIC BLOOD PRESSURE: 130 MMHG | BODY MASS INDEX: 21.97 KG/M2 | HEIGHT: 63 IN | DIASTOLIC BLOOD PRESSURE: 95 MMHG | WEIGHT: 124 LBS

## 2022-10-24 DIAGNOSIS — B37.31 ACUTE CANDIDIASIS OF VULVA AND VAGINA: ICD-10-CM

## 2022-10-24 PROCEDURE — 99203 OFFICE O/P NEW LOW 30 MIN: CPT

## 2022-10-24 NOTE — HISTORY OF PRESENT ILLNESS
[FreeTextEntry1] : 41 y/o  LMP 10/9/2022 presents to office today for new patient GYN visit with complaints of vaginal swelling and unusual discharge. Reports white vaginal discharge started 3 days prior; looked at vagina in mirror saw white film covering inside of vagina; no abnormal bleeding/odor/pelvic pain. Reports being on antibiotics for a kidney infection earlier this months; no urinary symptoms; did not try any otc medications for symptoms. \par \par Using withdrawl method for contraception. \par Unsure of last pap date.\par Menarche age 20, menses q 35-40 days apart lasting approx 5 days. Reports heavy painful periods. \par Hx of anxiety\par Family hx + for Breast CA (mother)\par ALL: PCN (hives and trouble breathing), Shellfish, MRI and CT contrast\par Takes Adderall 20mg 2x daily

## 2022-10-24 NOTE — PLAN
[FreeTextEntry1] : Vaginitis\par -Strategies to decrease vaginitis symptoms and recurrence discussed. Wear cotton underwear, loose fitting closes and avoid panty hose.  Avoid hot tubs, spas and douching as well as hygiene sprays, fragrances and powders in the genital area.  Use pads instead of tampons while you have an infection.  Appropriate front to back cleaning after using the toilet.\par -recent abx use/white vaginal d/c; dx yeast vaginitis; erx fluconazole 200mg po x1 dose, erx for lotrisone placed 2-3 x daily for external relief\par -bd affirm sent today\par \par Pt aware she is due for routine GYN exam and pap/mammo; can also discuss painful/heavy menses as well if she would like. Advised to make next available GYN appt. RTO for annual. \par \par During this visit 30 minutes were spent face-to-face with greater than 50% of this time dedicated to counseling.\par \par

## 2022-10-24 NOTE — CHIEF COMPLAINT
[Urgent Visit] : Urgent Visit [FreeTextEntry1] : 42 y o pt presents today for vaginal discharge and swelling. pt states this has been going on for 3 days now.

## 2022-10-24 NOTE — PHYSICAL EXAM
[Chaperone Declined] : Patient declined chaperone [Appropriately responsive] : appropriately responsive [Alert] : alert [No Acute Distress] : no acute distress [Soft] : soft [Non-tender] : non-tender [Non-distended] : non-distended [No Lesions] : no lesions [No Mass] : no mass [Labia Majora] : normal [Labia Minora] : normal [Discharge] : a  ~M vaginal discharge was present [Moderate] : moderate [White] : white [Curds] : curd-like [Normal] : normal [Uterine Adnexae] : normal

## 2022-10-25 LAB
CANDIDA VAG CYTO: DETECTED
G VAGINALIS+PREV SP MTYP VAG QL MICRO: NOT DETECTED
T VAGINALIS VAG QL WET PREP: NOT DETECTED

## 2022-10-26 ENCOUNTER — LABORATORY RESULT (OUTPATIENT)
Age: 42
End: 2022-10-26

## 2022-11-16 LAB
25(OH)D3 SERPL-MCNC: 20.2 NG/ML
ALBUMIN SERPL ELPH-MCNC: 4.3 G/DL
ALP BLD-CCNC: 80 U/L
ALT SERPL-CCNC: 11 U/L
ANION GAP SERPL CALC-SCNC: 11 MMOL/L
APPEARANCE: CLEAR
AST SERPL-CCNC: 13 U/L
BASOPHILS # BLD AUTO: 0.04 K/UL
BASOPHILS NFR BLD AUTO: 0.7 %
BILIRUB SERPL-MCNC: 0.2 MG/DL
BILIRUBIN URINE: NEGATIVE
BLOOD URINE: ABNORMAL
BUN SERPL-MCNC: 13 MG/DL
CALCIUM SERPL-MCNC: 9.6 MG/DL
CHLORIDE SERPL-SCNC: 99 MMOL/L
CHOLEST SERPL-MCNC: 163 MG/DL
CO2 SERPL-SCNC: 26 MMOL/L
COLOR: NORMAL
CREAT SERPL-MCNC: 0.6 MG/DL
EGFR: 116 ML/MIN/1.73M2
EOSINOPHIL # BLD AUTO: 0.07 K/UL
EOSINOPHIL NFR BLD AUTO: 1.1 %
ESTIMATED AVERAGE GLUCOSE: 117 MG/DL
GLUCOSE QUALITATIVE U: NEGATIVE
GLUCOSE SERPL-MCNC: 105 MG/DL
HBA1C MFR BLD HPLC: 5.7 %
HCT VFR BLD CALC: 34.4 %
HDLC SERPL-MCNC: 62 MG/DL
HGB BLD-MCNC: 11.5 G/DL
IMM GRANULOCYTES NFR BLD AUTO: 0.2 %
KETONES URINE: NEGATIVE
LDLC SERPL CALC-MCNC: 89 MG/DL
LEUKOCYTE ESTERASE URINE: ABNORMAL
LYMPHOCYTES # BLD AUTO: 1.26 K/UL
LYMPHOCYTES NFR BLD AUTO: 20.6 %
MAN DIFF?: NORMAL
MCHC RBC-ENTMCNC: 29 PG
MCHC RBC-ENTMCNC: 33.4 GM/DL
MCV RBC AUTO: 86.9 FL
MONOCYTES # BLD AUTO: 0.36 K/UL
MONOCYTES NFR BLD AUTO: 5.9 %
NEUTROPHILS # BLD AUTO: 4.39 K/UL
NEUTROPHILS NFR BLD AUTO: 71.5 %
NITRITE URINE: NEGATIVE
NONHDLC SERPL-MCNC: 101 MG/DL
PH URINE: 6.5
PLATELET # BLD AUTO: 239 K/UL
POTASSIUM SERPL-SCNC: 4.6 MMOL/L
PROT SERPL-MCNC: 7.5 G/DL
PROTEIN URINE: NORMAL
RBC # BLD: 3.96 M/UL
RBC # FLD: 14.6 %
SODIUM SERPL-SCNC: 136 MMOL/L
SPECIFIC GRAVITY URINE: 1.02
TRIGL SERPL-MCNC: 59 MG/DL
TSH SERPL-ACNC: 2.98 UIU/ML
UROBILINOGEN URINE: NORMAL
WBC # FLD AUTO: 6.13 K/UL

## 2022-11-21 ENCOUNTER — APPOINTMENT (OUTPATIENT)
Dept: FAMILY MEDICINE | Facility: CLINIC | Age: 42
End: 2022-11-21
Payer: COMMERCIAL

## 2022-11-21 VITALS
HEART RATE: 83 BPM | HEIGHT: 63 IN | RESPIRATION RATE: 14 BRPM | WEIGHT: 124 LBS | DIASTOLIC BLOOD PRESSURE: 72 MMHG | TEMPERATURE: 98.3 F | SYSTOLIC BLOOD PRESSURE: 111 MMHG | BODY MASS INDEX: 21.97 KG/M2 | OXYGEN SATURATION: 100 %

## 2022-11-21 DIAGNOSIS — R23.8 OTHER SKIN CHANGES: ICD-10-CM

## 2022-11-21 DIAGNOSIS — R73.03 PREDIABETES.: ICD-10-CM

## 2022-11-21 DIAGNOSIS — E55.9 VITAMIN D DEFICIENCY, UNSPECIFIED: ICD-10-CM

## 2022-11-21 PROCEDURE — 93000 ELECTROCARDIOGRAM COMPLETE: CPT

## 2022-11-21 PROCEDURE — 99214 OFFICE O/P EST MOD 30 MIN: CPT | Mod: 25

## 2022-11-21 RX ORDER — CLOTRIMAZOLE AND BETAMETHASONE DIPROPIONATE 10; .5 MG/G; MG/G
1-0.05 CREAM TOPICAL 3 TIMES DAILY
Qty: 1 | Refills: 1 | Status: DISCONTINUED | COMMUNITY
Start: 2022-10-24 | End: 2022-11-21

## 2022-11-21 RX ORDER — FLUCONAZOLE 200 MG/1
200 TABLET ORAL
Qty: 1 | Refills: 0 | Status: DISCONTINUED | COMMUNITY
Start: 2022-10-24 | End: 2022-11-21

## 2022-11-21 RX ORDER — MULTIVIT-MIN/FOLIC/VIT K/LYCOP 400-300MCG
25 MCG TABLET ORAL DAILY
Qty: 90 | Refills: 3 | Status: ACTIVE | COMMUNITY
Start: 2022-11-21 | End: 1900-01-01

## 2022-11-21 NOTE — HISTORY OF PRESENT ILLNESS
[FreeTextEntry8] : 42 year old female presents for ongoing management of adjustment disorder with mixed anxiety and depression. She also notes easy bruising. She is starting to feel better with 10 mg fluoxetine. She is spending more time at home, has partaken in some house projects, manages the household, still feels "busy 24/7", but feels more alert now to make decisions. 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 She does feel that the 10 mg of fluoxetine, which she used to take very early in the morning, tends to wane over the course of the day, and by the evening she would feel more irritable. She has started to take her daily dose closer to 10 AM to adjust.\par \par She also states that she finds that she bruises easily. She notes that it takes a while for bleeding to stop. Her platelet count is within normal limits. Hematocrit is just below normal at 34.4 and her vitamin D level is low at 20.2. Her HbA1c is 5.7%. She states that she eats a lot of rice and pastas that she can cut back on.\par \par

## 2022-12-07 ENCOUNTER — APPOINTMENT (OUTPATIENT)
Dept: OBGYN | Facility: CLINIC | Age: 42
End: 2022-12-07

## 2022-12-07 VITALS
SYSTOLIC BLOOD PRESSURE: 123 MMHG | WEIGHT: 123 LBS | BODY MASS INDEX: 21 KG/M2 | HEIGHT: 64 IN | DIASTOLIC BLOOD PRESSURE: 79 MMHG

## 2022-12-07 DIAGNOSIS — Z80.3 FAMILY HISTORY OF MALIGNANT NEOPLASM OF BREAST: ICD-10-CM

## 2022-12-07 DIAGNOSIS — Z30.09 ENCOUNTER FOR OTHER GENERAL COUNSELING AND ADVICE ON CONTRACEPTION: ICD-10-CM

## 2022-12-07 DIAGNOSIS — Z12.4 ENCOUNTER FOR SCREENING FOR MALIGNANT NEOPLASM OF CERVIX: ICD-10-CM

## 2022-12-07 DIAGNOSIS — Z01.419 ENCOUNTER FOR GYNECOLOGICAL EXAMINATION (GENERAL) (ROUTINE) W/OUT ABNORMAL FINDINGS: ICD-10-CM

## 2022-12-07 DIAGNOSIS — Z11.51 ENCOUNTER FOR SCREENING FOR HUMAN PAPILLOMAVIRUS (HPV): ICD-10-CM

## 2022-12-07 DIAGNOSIS — Z30.011 ENCOUNTER FOR INITIAL PRESCRIPTION OF CONTRACEPTIVE PILLS: ICD-10-CM

## 2022-12-07 PROCEDURE — 99396 PREV VISIT EST AGE 40-64: CPT

## 2022-12-07 NOTE — HISTORY OF PRESENT ILLNESS
[FreeTextEntry1] : 41 y/o  LMP 22 presents for annual GYN exam. Doing well no complaints. \par Last seen for acute GYN visit for vaginitis symptoms, +yeast, treated with Diflucan in 2022. \par Interested in "non-hormonal" methods of birth control; reports that she didn't like ocps she was on years ago  and during fertility treatments in the past. Reports monthly menses; sometimes heavy. No pelvic pain, abnormal bleeding, pain with intercourse or breast complaints. \par \par Using withdrawl method for contraception. \par Unsure of last pap date.\par Menarche age 20, menses q 35-40 days apart lasting approx 5 days. Reports heavy painful periods. \par Hx of anxiety\par Family hx + for Breast CA (mother)\par ALL: PCN (hives and trouble breathing), Shellfish, MRI and CT contrast\par Takes Adderall 20mg 2x daily, Prozac 10mg po daily. \par  \par

## 2022-12-07 NOTE — PLAN
[FreeTextEntry1] : Routine GYN Exam\par -Discussed and reviewed importance of monthly BSE\par -Declines STI testing, importance safe sexual practices discussed\par -Pap/HPV test collected and sent at todays visit\par -RX mammo/sono given to pt with locations and instructions\par -Osteoporosis prevention; recc. vitd/Calcium supplementation and WBE to maintain bone density; start DEXA >65\par -f/u PCP for recommended HCM, vaccinations and CA screening\par \par Heavy menses, contraception\par -discussed different contraceptive options both mirena and paragard (discouraged paragard, pt hx of heavy menses), open to starting ocp (loloestrin/ junel /120.) given 3 months supply loloestrin fe today\par - Oral contraceptive counseling provided to the patient.  Discussed risks and benefits, including thromboembolic events, especially in the setting of smoking, or in the setting of pre-existing medical conditions that predispose to adverse cardiovascular events.  Benign side effects reviewed as well as risk of unintended pregnancy.   Discussion regarding decreased contraceptive efficacy when taken with certain medications or when dosing schedule is erratic.  They do not protect against STI's. All questions answered.\par \par rto 3 months for follow up on birth control; BP check\par \par RTO 1 yr or sooner as needed. \par \par

## 2022-12-12 ENCOUNTER — APPOINTMENT (OUTPATIENT)
Dept: FAMILY MEDICINE | Facility: CLINIC | Age: 42
End: 2022-12-12

## 2022-12-12 VITALS
RESPIRATION RATE: 14 BRPM | OXYGEN SATURATION: 100 % | HEART RATE: 100 BPM | TEMPERATURE: 98.2 F | DIASTOLIC BLOOD PRESSURE: 74 MMHG | WEIGHT: 122 LBS | BODY MASS INDEX: 20.94 KG/M2 | SYSTOLIC BLOOD PRESSURE: 107 MMHG

## 2022-12-12 DIAGNOSIS — F43.23 ADJUSTMENT DISORDER WITH MIXED ANXIETY AND DEPRESSED MOOD: ICD-10-CM

## 2022-12-12 DIAGNOSIS — J10.1 INFLUENZA DUE TO OTHER IDENTIFIED INFLUENZA VIRUS WITH OTHER RESPIRATORY MANIFESTATIONS: ICD-10-CM

## 2022-12-12 PROCEDURE — 99214 OFFICE O/P EST MOD 30 MIN: CPT

## 2022-12-12 NOTE — PHYSICAL EXAM
[Normal Outer Ear/Nose] : the outer ears and nose were normal in appearance [Normal Oropharynx] : the oropharynx was normal [Normal TMs] : both tympanic membranes were normal [Normal] : normal rate, regular rhythm, normal S1 and S2 and no murmur heard [de-identified] : swollen bilateral nasal turbinates with no erythema or purulence

## 2022-12-12 NOTE — REVIEW OF SYSTEMS
[Fever] : fever [Chills] : chills [Fatigue] : no fatigue [Hot Flashes] : no hot flashes [Night Sweats] : no night sweats [Recent Change In Weight] : ~T no recent weight change [Nasal Discharge] : nasal discharge [Sore Throat] : no sore throat [Postnasal Drip] : postnasal drip [Shortness Of Breath] : no shortness of breath [Wheezing] : wheezing [Cough] : cough [Dyspnea on Exertion] : no dyspnea on exertion [Abdominal Pain] : no abdominal pain [Nausea] : nausea [Constipation] : no constipation [Diarrhea] : diarrhea [Vomiting] : vomiting [Heartburn] : no heartburn [Melena] : no melena [Negative] : Cardiovascular

## 2022-12-12 NOTE — HISTORY OF PRESENT ILLNESS
[FreeTextEntry8] : 42 year old female presents for illness after influenza infection. She states that her son had tested positive for Influenza A over Thanksgiving and she caught the illness shortly afterwards. Her symptoms started the day after Thanksgiving and lasted until 12/8 when she when felt "100%". Then on 12/10, she started her to experience body aches, fever with max temperature of 101 F, hot flashes, chils, and had 1 episode of vomiting last evening. She also started her menstrual period on 12/10 and that may be exacerbating her symptoms. She has used DayQuil with some relief but noted that her vomitus last night was DayQuil-orange in color. She also notes polydipsia.  Denies headache, dizziness, diarrhea, vision changes, chest pain, dyspnea, abdominal pain, changes to bowel or bladder habits, or lower extremity swelling. \par \par She does not know if her increased SSRI dose has been improving her mood because of her recent illness the last few weeks. She denies suicidal ideation or intent to self harm.

## 2022-12-21 LAB
CYTOLOGY CVX/VAG DOC THIN PREP: NORMAL
HPV HIGH+LOW RISK DNA PNL CVX: DETECTED

## 2023-03-24 ENCOUNTER — APPOINTMENT (OUTPATIENT)
Dept: OBGYN | Facility: CLINIC | Age: 43
End: 2023-03-24
Payer: COMMERCIAL

## 2023-03-24 ENCOUNTER — APPOINTMENT (OUTPATIENT)
Dept: OBGYN | Facility: CLINIC | Age: 43
End: 2023-03-24

## 2023-03-24 VITALS
BODY MASS INDEX: 22.71 KG/M2 | HEIGHT: 64 IN | SYSTOLIC BLOOD PRESSURE: 113 MMHG | WEIGHT: 133 LBS | DIASTOLIC BLOOD PRESSURE: 81 MMHG

## 2023-03-24 PROCEDURE — 99213 OFFICE O/P EST LOW 20 MIN: CPT

## 2023-03-24 NOTE — HISTORY OF PRESENT ILLNESS
[FreeTextEntry1] : 41 y/o  LMP 3/3/2023  pt presents today for a follow up visit. Pt stopped her lo loestrin fe after 1 month of taking it. For the past months she feels fatigue, mood swings, night sweats and hot flashes. Feels that her hormones are off and thinks she may be going through "perimenopause". Was placed on low dose ocp because of heavy menses and contraception. \par \par Last pap 2022, NILM, HRHPV +, for rpt 2023. \par Menarche age 20, menses q 35-40 days apart lasting approx 5 days. Reports heavy painful periods. \par Hx of anxiety\par Family hx + for Breast CA (mother)\par ALL: PCN (hives and trouble breathing), Shellfish, MRI and CT contrast\par Takes Adderall 20mg 2x daily, Prozac 10mg po daily

## 2023-03-24 NOTE — PLAN
[FreeTextEntry1] : Menorrhagia with vasomotor symptoms\par -discussed in detail, methods of contraception. pt does not want ocp and discussed that copper iud may increase her heavy bleeding. b/r/se reviewed in detail \par -discussed behavioral modification, diet exercise, f/u w./ psych and pcp\par -pt opted for Mirena iud, can have hormones done day 2/3 as requested. \par \par During this visit 25 minutes were spent face-to-face with greater than 50% of the time dedicated to counseling.\par

## 2023-03-24 NOTE — COUNSELING
[Vitamins/Supplements] : vitamins/supplements [Breast Self Exam] : breast self exam [Confidentiality] : confidentiality [STD (testing, results, tx)] : STD (testing, results, tx) [Lab Results] : lab results [Medication Management] : medication management

## 2023-03-24 NOTE — PHYSICAL EXAM
[Chaperone Present] : A chaperone was present in the examining room during all aspects of the physical examination [FreeTextEntry1] : NP Student JS present for visit with pt permission [Appropriately responsive] : appropriately responsive [Alert] : alert [No Acute Distress] : no acute distress [Soft] : soft [Non-tender] : non-tender [Non-distended] : non-distended [No HSM] : No HSM [No Lesions] : no lesions [No Mass] : no mass [Oriented x3] : oriented x3 [Uterine Adnexae] : normal

## 2023-04-07 ENCOUNTER — APPOINTMENT (OUTPATIENT)
Dept: OBGYN | Facility: CLINIC | Age: 43
End: 2023-04-07

## 2023-08-16 ENCOUNTER — RX RENEWAL (OUTPATIENT)
Age: 43
End: 2023-08-16

## 2023-09-11 ENCOUNTER — NON-APPOINTMENT (OUTPATIENT)
Age: 43
End: 2023-09-11

## 2023-09-15 ENCOUNTER — NON-APPOINTMENT (OUTPATIENT)
Age: 43
End: 2023-09-15

## 2023-11-10 ENCOUNTER — RX RENEWAL (OUTPATIENT)
Age: 43
End: 2023-11-10

## 2023-11-10 RX ORDER — FLUOXETINE HYDROCHLORIDE 20 MG/1
20 TABLET ORAL DAILY
Qty: 90 | Refills: 3 | Status: ACTIVE | COMMUNITY
Start: 2022-11-21 | End: 1900-01-01

## 2023-11-26 ENCOUNTER — RX RENEWAL (OUTPATIENT)
Age: 43
End: 2023-11-26

## 2024-01-30 ENCOUNTER — NON-APPOINTMENT (OUTPATIENT)
Age: 44
End: 2024-01-30

## 2024-03-05 ENCOUNTER — RX RENEWAL (OUTPATIENT)
Age: 44
End: 2024-03-05

## 2024-03-05 RX ORDER — FLUOXETINE HYDROCHLORIDE 10 MG/1
10 TABLET ORAL DAILY
Qty: 90 | Refills: 3 | Status: ACTIVE | COMMUNITY
Start: 2022-10-20 | End: 1900-01-01

## 2024-03-25 ENCOUNTER — NON-APPOINTMENT (OUTPATIENT)
Age: 44
End: 2024-03-25

## 2024-05-23 ENCOUNTER — NON-APPOINTMENT (OUTPATIENT)
Age: 44
End: 2024-05-23

## 2024-09-19 ENCOUNTER — APPOINTMENT (OUTPATIENT)
Dept: FAMILY MEDICINE | Facility: CLINIC | Age: 44
End: 2024-09-19

## 2024-09-19 VITALS
SYSTOLIC BLOOD PRESSURE: 119 MMHG | BODY MASS INDEX: 24.92 KG/M2 | DIASTOLIC BLOOD PRESSURE: 67 MMHG | HEART RATE: 82 BPM | HEIGHT: 64 IN | OXYGEN SATURATION: 99 % | WEIGHT: 146 LBS | TEMPERATURE: 98.6 F | RESPIRATION RATE: 14 BRPM

## 2024-09-19 DIAGNOSIS — R73.03 PREDIABETES.: ICD-10-CM

## 2024-09-19 DIAGNOSIS — K59.00 CONSTIPATION, UNSPECIFIED: ICD-10-CM

## 2024-09-19 DIAGNOSIS — E55.9 VITAMIN D DEFICIENCY, UNSPECIFIED: ICD-10-CM

## 2024-09-19 DIAGNOSIS — Z12.39 ENCOUNTER FOR OTHER SCREENING FOR MALIGNANT NEOPLASM OF BREAST: ICD-10-CM

## 2024-09-19 PROCEDURE — 99214 OFFICE O/P EST MOD 30 MIN: CPT

## 2024-09-19 PROCEDURE — G2211 COMPLEX E/M VISIT ADD ON: CPT | Mod: NC

## 2024-09-26 NOTE — HISTORY OF PRESENT ILLNESS
[de-identified] : 44-year-old female presents for ongoing management of chronic medical conditions and renewal of her fluoxetine medication. She reported good overall health but expressed concerns about weight gain and constipation despite dietary changes. Tried increasing fiber intake through salads and berries, but it exacerbated constipation. Experienced a feeling of blockage and inability to have bowel movements for up to 5 days. Tried over-the-counter remedies like Miralax and digestive enzymes with limited success. Reported straining and incomplete evacuation during bowel movements. Previous lab work, including hemoglobin A1c of 5.7 and slightly low vitamin D levels.

## 2024-11-06 NOTE — DISCHARGE NOTE OB - AVOID SITTING IN ONE POSITION FOR MORE THAN ONE HOUR
Pregestational Diabetes  Previously counseled    All values elevated   On Metformin 1000 BID and change to NPH 8/8( from 4/4)    Recommendations (Please refer to [facility] guidelines):  Baseline evaluation if not completed  Continue to check blood sugars 4x daily  Fasting and 2-hour postprandial glucose monitoring.   Goals of fasting levels below 95 mg/dL and postprandial levels below 120 mg/dL.   Patient will send BS log weekly through portal on those weeks she is not seen in clinic  Medications:   Start low dose aspirin 81 mg daily at 12-16 weeks for preeclampsia risk reduction  1 mg folic acid daily  Multiple dose injectable insulin  Patient's current regimen is:  After consultation, the following adjustments were made:NPH 8/8 with Metformin 1000 BID      
Statement Selected

## 2025-01-14 ENCOUNTER — NON-APPOINTMENT (OUTPATIENT)
Age: 45
End: 2025-01-14

## 2025-04-10 ENCOUNTER — NON-APPOINTMENT (OUTPATIENT)
Age: 45
End: 2025-04-10

## 2025-08-28 ENCOUNTER — NON-APPOINTMENT (OUTPATIENT)
Age: 45
End: 2025-08-28